# Patient Record
Sex: FEMALE | Race: WHITE | Employment: UNEMPLOYED | URBAN - METROPOLITAN AREA
[De-identification: names, ages, dates, MRNs, and addresses within clinical notes are randomized per-mention and may not be internally consistent; named-entity substitution may affect disease eponyms.]

---

## 2017-06-02 ENCOUNTER — OFFICE VISIT (OUTPATIENT)
Dept: OBGYN CLINIC | Age: 22
End: 2017-06-02

## 2017-06-02 VITALS
SYSTOLIC BLOOD PRESSURE: 103 MMHG | DIASTOLIC BLOOD PRESSURE: 60 MMHG | HEIGHT: 59 IN | BODY MASS INDEX: 18.35 KG/M2 | WEIGHT: 91 LBS | RESPIRATION RATE: 19 BRPM | HEART RATE: 91 BPM

## 2017-06-02 DIAGNOSIS — Z11.3 SCREENING FOR VENEREAL DISEASE: ICD-10-CM

## 2017-06-02 DIAGNOSIS — Z30.09 ENCOUNTER FOR OTHER GENERAL COUNSELING OR ADVICE ON CONTRACEPTION: Primary | ICD-10-CM

## 2017-06-02 RX ORDER — MEDROXYPROGESTERONE ACETATE 150 MG/ML
150 INJECTION, SUSPENSION INTRAMUSCULAR ONCE
Qty: 1 SYRINGE | Refills: 1 | Status: SHIPPED | OUTPATIENT
Start: 2017-06-02 | End: 2017-06-02

## 2017-06-02 NOTE — PROGRESS NOTES
Contraception evaluation/followup    The patient is a 22 y.o.,  Patient's last menstrual period was 2016 (approximate). .     She is here for contraceptive counseling/folllow up. Her current method of family planning is none. The patient is sexually active. She states she is not satisfied with her current form of contraception because: she would like something more effective   These concerns are severe    Contraceptive History: has used other contraception in the past: IUD. Side effects were: moderate. Her current menstrual difficulty history: amenorrhea. Her previous menses she describes as heavy lasting up to 7 days. Pad or tampon count: changes every 4 hours. Preventive Medicine History: Last Pap smear:patient has never had a pap test.       She has had irregular bleeding with Mirena and Nexplanon. Had IUD taken out 2 weeks and no BC for past 2 weeks. Wants to start Depo Provera as she was on it in the past and it worked well. No past medical history on file. No past surgical history on file. Social History     Occupational History    Not on file. Social History Main Topics    Smoking status: Never Smoker    Smokeless tobacco: Never Used    Alcohol use Yes    Drug use: No    Sexual activity: Yes     No family history on file.     No Known Allergies  Prior to Admission medications    Not on File        Review of Systems - History obtained from the patient  Constitutional: negative for weight loss, fever, night sweats  HEENT: negative for hearing loss, earache, congestion, snoring, sorethroat  CV: negative for chest pain, palpitations, edema  Resp: negative for cough, shortness of breath, wheezing  Breast: negative for breast lumps, nipple discharge, galactorrhea  GI: negative for change in bowel habits, abdominal pain, black or bloody stools  : negative for frequency, dysuria, hematuria  MSK: negative for back pain, joint pain, muscle pain  Skin: negative for itching, rash, hives  Neuro: negative for dizziness, headache, confusion, weakness  Psych: negative for anxiety, depression, change in mood  Heme/lymph: negative for bleeding, bruising, pallor      Objective:    Visit Vitals    /60 (BP 1 Location: Left arm, BP Patient Position: Sitting)    Pulse 91    Resp 19    Ht 4' 11\" (1.499 m)    Wt 91 lb (41.3 kg)    LMP 12/01/2016 (Approximate)    BMI 18.38 kg/m2          PHYSICAL EXAMINATION    Constitutional  · Appearance: well-nourished, well developed, alert, in no acute distress    HENT  · Head and Face: appears normal       Neurologic/Psychiatric  · Mental Status:  · Orientation: grossly oriented to person, place and time  · Mood and Affect: mood normal, affect appropriate    Assessment:   Contraception management:       Plan:   Advised to use condoms for 2 weeks and RTO for UPT and first Depo Provera shot, or, if menses in next 2 weeks then RTO for first shot at that time. Instructions given to pt    Total face to face time was 15 minutes and over half of the time was for pt counseling.

## 2017-06-05 NOTE — PROGRESS NOTES
Culture is positive for chlamydia. She needs Zithromax 1 gram po x 1 and a repeat culture in 6 weeks. Partner needs to be treated.

## 2017-06-06 ENCOUNTER — TELEPHONE (OUTPATIENT)
Dept: OBGYN CLINIC | Age: 22
End: 2017-06-06

## 2017-06-06 RX ORDER — AZITHROMYCIN 500 MG/1
TABLET, FILM COATED ORAL
Qty: 2 TAB | Refills: 0 | Status: SHIPPED | OUTPATIENT
Start: 2017-06-06

## 2017-06-06 NOTE — TELEPHONE ENCOUNTER
Call received at 118pm    22year old patient calling back to in response to message left from the office. Patient advised of MD reviewed lab results and recommendations. Prescription sent as per MD order to patient preferred pharmacy. Patient advised of mary moran to have repeat culture in 6 weeks. Patient will call back to schedule that appointment. Patient advised of need for partner to be treated as well. Patient verbalized understanding.

## 2017-06-06 NOTE — PROGRESS NOTES
Patient advised of MD reviewed labs and recommendations. Patient advised of Prescription to be sent as per MD order to patient preferred pharmacy . Patient verbalized understanding and will call back to schedule appointment for repeat culture.

## 2017-07-13 LAB
C TRACH RRNA SPEC QL NAA+PROBE: POSITIVE
N GONORRHOEA RRNA SPEC QL NAA+PROBE: NEGATIVE

## 2017-09-30 ENCOUNTER — HOSPITAL ENCOUNTER (EMERGENCY)
Age: 22
Discharge: HOME | End: 2017-09-30
Attending: STUDENT IN AN ORGANIZED HEALTH CARE EDUCATION/TRAINING PROGRAM
Payer: SELF-PAY

## 2017-09-30 VITALS
RESPIRATION RATE: 18 BRPM | WEIGHT: 100 LBS | DIASTOLIC BLOOD PRESSURE: 54 MMHG | TEMPERATURE: 97.9 F | HEART RATE: 98 BPM | OXYGEN SATURATION: 99 % | HEIGHT: 59 IN | BODY MASS INDEX: 20.16 KG/M2 | SYSTOLIC BLOOD PRESSURE: 115 MMHG

## 2017-09-30 DIAGNOSIS — F19.10 DRUG ABUSE (HCC): ICD-10-CM

## 2017-09-30 DIAGNOSIS — T50.902A OVERDOSE, INTENTIONAL SELF-HARM, INITIAL ENCOUNTER (HCC): Primary | ICD-10-CM

## 2017-09-30 LAB
ALBUMIN SERPL-MCNC: 4.3 G/DL (ref 3.5–5)
ALBUMIN/GLOB SERPL: 1.3 {RATIO} (ref 1.1–2.2)
ALP SERPL-CCNC: 83 U/L (ref 45–117)
ALT SERPL-CCNC: 21 U/L (ref 12–78)
AMPHET UR QL SCN: NEGATIVE
ANION GAP SERPL CALC-SCNC: 12 MMOL/L (ref 5–15)
APAP SERPL-MCNC: <2 UG/ML (ref 10–30)
APPEARANCE UR: CLEAR
AST SERPL-CCNC: 20 U/L (ref 15–37)
BARBITURATES UR QL SCN: NEGATIVE
BASOPHILS # BLD: 0.1 K/UL (ref 0–0.1)
BASOPHILS NFR BLD: 1 % (ref 0–1)
BENZODIAZ UR QL: POSITIVE
BILIRUB SERPL-MCNC: 0.7 MG/DL (ref 0.2–1)
BILIRUB UR QL: NEGATIVE
BUN SERPL-MCNC: 8 MG/DL (ref 6–20)
BUN/CREAT SERPL: 12 (ref 12–20)
CALCIUM SERPL-MCNC: 8.7 MG/DL (ref 8.5–10.1)
CANNABINOIDS UR QL SCN: NEGATIVE
CHLORIDE SERPL-SCNC: 107 MMOL/L (ref 97–108)
CO2 SERPL-SCNC: 23 MMOL/L (ref 21–32)
COCAINE UR QL SCN: POSITIVE
COLOR UR: ABNORMAL
CREAT SERPL-MCNC: 0.68 MG/DL (ref 0.55–1.02)
DRUG SCRN COMMENT,DRGCM: ABNORMAL
EOSINOPHIL # BLD: 0.3 K/UL (ref 0–0.4)
EOSINOPHIL NFR BLD: 4 % (ref 0–7)
ERYTHROCYTE [DISTWIDTH] IN BLOOD BY AUTOMATED COUNT: 12.3 % (ref 11.5–14.5)
ETHANOL SERPL-MCNC: 95 MG/DL
GLOBULIN SER CALC-MCNC: 3.2 G/DL (ref 2–4)
GLUCOSE SERPL-MCNC: 95 MG/DL (ref 65–100)
GLUCOSE UR STRIP.AUTO-MCNC: NEGATIVE MG/DL
HCG UR QL: NEGATIVE
HCT VFR BLD AUTO: 41.9 % (ref 35–47)
HGB BLD-MCNC: 15.3 G/DL (ref 11.5–16)
HGB UR QL STRIP: NEGATIVE
KETONES UR QL STRIP.AUTO: ABNORMAL MG/DL
LEUKOCYTE ESTERASE UR QL STRIP.AUTO: NEGATIVE
LIPASE SERPL-CCNC: 126 U/L (ref 73–393)
LYMPHOCYTES # BLD: 3.5 K/UL (ref 0.8–3.5)
LYMPHOCYTES NFR BLD: 39 % (ref 12–49)
MCH RBC QN AUTO: 33.8 PG (ref 26–34)
MCHC RBC AUTO-ENTMCNC: 36.5 G/DL (ref 30–36.5)
MCV RBC AUTO: 92.7 FL (ref 80–99)
METHADONE UR QL: NEGATIVE
MONOCYTES # BLD: 0.7 K/UL (ref 0–1)
MONOCYTES NFR BLD: 8 % (ref 5–13)
NEUTS SEG # BLD: 4.3 K/UL (ref 1.8–8)
NEUTS SEG NFR BLD: 48 % (ref 32–75)
NITRITE UR QL STRIP.AUTO: NEGATIVE
OPIATES UR QL: NEGATIVE
PCP UR QL: NEGATIVE
PH UR STRIP: 6 [PH] (ref 5–8)
PLATELET # BLD AUTO: 342 K/UL (ref 150–400)
POTASSIUM SERPL-SCNC: 3.9 MMOL/L (ref 3.5–5.1)
PROT SERPL-MCNC: 7.5 G/DL (ref 6.4–8.2)
PROT UR STRIP-MCNC: NEGATIVE MG/DL
RBC # BLD AUTO: 4.52 M/UL (ref 3.8–5.2)
SALICYLATES SERPL-MCNC: <1.7 MG/DL (ref 2.8–20)
SODIUM SERPL-SCNC: 142 MMOL/L (ref 136–145)
SP GR UR REFRACTOMETRY: 1.02 (ref 1–1.03)
UROBILINOGEN UR QL STRIP.AUTO: 0.2 EU/DL (ref 0.2–1)
WBC # BLD AUTO: 8.9 K/UL (ref 3.6–11)

## 2017-09-30 PROCEDURE — 80307 DRUG TEST PRSMV CHEM ANLYZR: CPT | Performed by: STUDENT IN AN ORGANIZED HEALTH CARE EDUCATION/TRAINING PROGRAM

## 2017-09-30 PROCEDURE — 85025 COMPLETE CBC W/AUTO DIFF WBC: CPT | Performed by: STUDENT IN AN ORGANIZED HEALTH CARE EDUCATION/TRAINING PROGRAM

## 2017-09-30 PROCEDURE — 74011250636 HC RX REV CODE- 250/636: Performed by: STUDENT IN AN ORGANIZED HEALTH CARE EDUCATION/TRAINING PROGRAM

## 2017-09-30 PROCEDURE — 96374 THER/PROPH/DIAG INJ IV PUSH: CPT

## 2017-09-30 PROCEDURE — 74011000250 HC RX REV CODE- 250: Performed by: STUDENT IN AN ORGANIZED HEALTH CARE EDUCATION/TRAINING PROGRAM

## 2017-09-30 PROCEDURE — 81025 URINE PREGNANCY TEST: CPT

## 2017-09-30 PROCEDURE — 81003 URINALYSIS AUTO W/O SCOPE: CPT | Performed by: STUDENT IN AN ORGANIZED HEALTH CARE EDUCATION/TRAINING PROGRAM

## 2017-09-30 PROCEDURE — 93005 ELECTROCARDIOGRAM TRACING: CPT

## 2017-09-30 PROCEDURE — 99285 EMERGENCY DEPT VISIT HI MDM: CPT

## 2017-09-30 PROCEDURE — 80053 COMPREHEN METABOLIC PANEL: CPT | Performed by: STUDENT IN AN ORGANIZED HEALTH CARE EDUCATION/TRAINING PROGRAM

## 2017-09-30 PROCEDURE — 90791 PSYCH DIAGNOSTIC EVALUATION: CPT

## 2017-09-30 PROCEDURE — 83690 ASSAY OF LIPASE: CPT | Performed by: STUDENT IN AN ORGANIZED HEALTH CARE EDUCATION/TRAINING PROGRAM

## 2017-09-30 RX ORDER — ONDANSETRON 2 MG/ML
4 INJECTION INTRAMUSCULAR; INTRAVENOUS
Status: COMPLETED | OUTPATIENT
Start: 2017-09-30 | End: 2017-09-30

## 2017-09-30 RX ADMIN — ONDANSETRON 4 MG: 2 INJECTION INTRAMUSCULAR; INTRAVENOUS at 03:39

## 2017-09-30 RX ADMIN — POISON ADSORBENT 50 G: 50 SUSPENSION ORAL at 03:42

## 2017-09-30 RX ADMIN — POISON ADSORBENT 50 G: 50 SUSPENSION ORAL at 03:39

## 2017-09-30 NOTE — ED NOTES
Spoke with niko with poison control, updated on patients labs and LOC. No new recommendations at this time.

## 2017-09-30 NOTE — ED NOTES
MD and David reviewed discharge instructions and options with patient; patient verbalized understanding. Pt. Ambulated to exit without difficulty and in no signs of acute distress. Pt. was provided with outpatient resources for psychiatric and substance abuse help.

## 2017-09-30 NOTE — ED NOTES
Assumed care of patient from [address] Patient resting on stretcher, NAD noted at this time; Denies complaints. Bed low and locked, call bell in reach. Will continue to monitor.

## 2017-09-30 NOTE — ED NOTES
Spoke with Anita Mast from Poison control recommended,  Activated charcoal  CBC  CMP  Salicylate  Acetaminophen  EKG  Dr Ephraim Quintana updated

## 2017-09-30 NOTE — DISCHARGE INSTRUCTIONS
Alcohol, Drug, or Poison Ingestion: Care Instructions  Your Care Instructions  A person can become very sick, or die, from swallowing or using alcohol, drugs, or poisons. Alcohol poisoning occurs when a person drinks a large amount of alcohol. Alcohol can stop nerve signals that control breathing. It can also stop the gag reflex that prevents choking. Alcohol poisoning is serious. It can lead to brain damage or death if it's not treated right away. Drugs can be used by accident or on purpose. They can be swallowed, inhaled, injected, or absorbed through the skin. Drugs include over-the-counter medicine (such as aspirin or acetaminophen) and prescription medicine. They also include vitamins and supplements. And they include illegal drugs such as cocaine and heroin. And poisons are all around us. They include household , cosmetics, houseplants, and garden chemicals. The doctor has checked you carefully, but problems can develop later. If you notice any problems or new symptoms, get medical treatment right away. Follow-up care is a key part of your treatment and safety. Be sure to make and go to all appointments, and call your doctor if you are having problems. It's also a good idea to know your test results and keep a list of the medicines you take. How can you care for yourself at home? Alcohol problems  · Talk to your doctor or counselor about programs that can help you stop using alcohol. · Plan ways to avoid being tempted to drink. ¨ Get rid of all alcohol in your home. ¨ Avoid places where you tend to drink. ¨ Stay away from places or events that offer alcohol. ¨ Stay away from people who drink a lot. Drug problems  · Talk to your doctor about programs that can help you stop using drugs. · Get rid of any drugs you might be tempted to misuse. · Learn how to say no when other people use drugs. · Don't spend time with people who use drugs.   Poison prevention  · Keep products in the containers they came in. Keep them with the original labels. · Be careful when you use cleaning products, paints, solvents, and pesticides. Read labels before use. Use a fan to move strong odors and fumes out of your home. · Do not mix cleaning products. Try to use nontoxic . These include vinegar, lemon juice, and baking soda. When should you call for help? Poison control centers, hospitals, or your doctor can give immediate advice in the case of a poisoning. The [facility] number is [phone]. Have the poison container with you so you can give complete information to the poison control center, such as what the poison or substance is, how much was taken and when. Do not try to make the person vomit. Call 911 anytime you think you may need emergency care. For example, call if you or someone else:  · Has used or currently uses alcohol or drugs and is very confused or can't stay awake. · Has passed out (lost consciousness). · Has severe trouble breathing. · Is having a seizure. Call your doctor now or seek immediate medical care if you or someone else:  · Has new symptoms, or is not acting normally. Watch closely for changes in your health, and be sure to contact your doctor if:  · You do not get better as expected. · You need help with drug or alcohol problems. · You have problems with depression or other mental health issues. Where can you learn more? Go to http://blas-isidro.info/. Enter H362 in the search box to learn more about \"Alcohol, Drug, or Poison Ingestion: Care Instructions. \"  Current as of: March 20, 2017  Content Version: 11.3  © 1962-0567 [employer]. Care instructions adapted under license by [employer] (which disclaims liability or warranty for this information).  If you have questions about a medical condition or this instruction, always ask your healthcare professional. [employer] disclaims any warranty or liability for your use of this information. Misuse of Multiple Drugs: Care Instructions  Your Care Instructions  You have had treatment to help your body get rid of a combination of any of these drugs:  · Prescription medicines  · Over-the-counter medicines  · Alcohol  · Illegal drugs  Taking some drugs together may cause a bad reaction. They can have unexpected or stronger effects on your body and mind. For example, benzodiazepines (such as alprazolam and lorazepam) and alcohol both depress the nervous system. Taken together, each one is stronger than when it is taken by itself. You are getting better, but it takes time for the drugs to leave your body. It may take up to 2 weeks for your mind to clear and your mood to improve. Depending on the drugs you took, the doctor might have:  · Watched your symptoms or done tests to find out what drugs were in your body. · Treated you to control your breathing, blood pressure, and heart rate. · Tried to remove the drugs from your body by pumping your stomach or giving you a substance by mouth that absorbs chemicals. · Given you a substance that neutralizes chemicals (antidote). · Given you oxygen to help you breathe. · Given you fluids. The doctor also watched you carefully to make sure you were recovering safely. Follow-up care is a key part of your treatment and safety. Be sure to make and go to all appointments, and call your doctor if you are having problems. It's also a good idea to know your test results and keep a list of the medicines you take. How can you care for yourself at home? · When you take substances like alcohol and some drugs regularly, your body gets used to them. This is called dependency. If you are dependent on them, you may have withdrawal symptoms when you stop taking them. These symptoms may include trembling, feeling restless, and sweating.  To help get past these:  ¨ Get plenty of rest.  ¨ Drink lots of fluids. ¨ Stay active. ¨ Eat a healthy diet. · If you had a tube in your throat to help you breathe, you may have a sore throat or hoarseness that can last a few days. Drinking fluids may help soothe your throat. Get help to stop using drugs. Talk to your doctor about drug and alcohol treatment programs. When should you call for help? Call 911 anytime you think you may need emergency care. For example, call if:  · You feel you cannot stop from hurting yourself or someone else. Call your doctor now or seek immediate medical care if:  · You have new or worse symptoms of withdrawal, such as trembling, feeling restless, and sweating, that you can't manage at home. Watch closely for changes in your health, and be sure to contact your doctor if:  · You do not get better as expected. · You need help finding the right place to get help with drug or alcohol problems. Where can you learn more? Go to http://blas-isidro.info/. Enter B109 in the search box to learn more about \"Misuse of Multiple Drugs: Care Instructions. \"  Current as of: January 24, 2017  Content Version: 11.3  © 4328-7643 [employer]. Care instructions adapted under license by [employer] (which disclaims liability or warranty for this information). If you have questions about a medical condition or this instruction, always ask your healthcare professional. [place] [address] any warranty or liability for your use of this information. Learning About Drug Misuse  What is drug misuse? Drug misuse means using drugs in a way that harms you or causes you to harm others. Your doctor may call it substance use disorder or drug abuse. It's possible to misuse almost any type of drug, including illegal drugs, prescription drugs, and over-the-counter drugs. An overdose happens when a person takes more than the normal or recommended amount of a drug.  An overdose can result in harmful symptoms or death. Could you have a problem? If there's a chance you may be misusing drugs, it's important to find out. So ask yourself a few questions. · Do you spend a lot of time thinking about your medicine or other drug--getting it and using it? Has that taken the place of other things you used to enjoy? · Have you had problems with your family or friends, or at work, because of your use? · Do you use drugs even when you've told yourself you won't? Do you think you might have a problem? If you do, then you've just taken an important first step. Many people have overcome this problem. And most of them started by reaching out to others, like caring friends or family, their doctor, or a support group. How is drug misuse treated? If you think you may have a problem with drugs, talk to your doctor. You and your doctor can decide whether you have a problem and what type of treatment might help you. You may need to stay in a hospital at first, so that you can be treated for withdrawal symptoms. One of the goals of treatment is helping you get used to life without the drug. Counseling can help you prepare for people or situations that might tempt you to start using again. You can practice these skills through one-on-one counseling, family therapy, or group therapy. Therapy may be part of inpatient treatment, where you stay in a treatment center. Or it may be part of outpatient treatment, where you can fit your therapy around your job or other responsibilities. Another goal of treatment is getting ongoing support for your drug-free life. Many people find support by going to meetings like Narcotics Anonymous or SMART Recovery. This type of support can help you feel less alone and more motivated to stay drug-free. You might talk to your doctor or do an online search for local treatment programs. Or you might tell a friend or loved one that you need help.   Follow-up care is a key part of your treatment and safety. Be sure to make and go to all appointments, and call your doctor if you are having problems. It's also a good idea to know your test results and keep a list of the medicines you take. Where can you learn more? Go to http://blas-isidro.info/. Enter [id number] in the search box to learn more about \"Learning About Drug Misuse. \"  Current as of: March 23, 2017  Content Version: 11.3  © 4381-4821 [employer]. Care instructions adapted under license by [employer] (which disclaims liability or warranty for this information). If you have questions about a medical condition or this instruction, always ask your healthcare professional. [address] any warranty or liability for your use of this information.

## 2017-09-30 NOTE — BSMART NOTE
Comprehensive Assessment Form Part 1      Section I - Disposition    Axis I - Cocaine Abuse                 R/O Unspecified Anxiety Disorder   Axis II - No diagnosis   Axis III - None  Axis IV - family conflict    [place] V - 62      The Medical Doctor to Psychiatrist conference was completed. The Medical Doctor is in agreement with Psychiatrist disposition. Patient does not have a history of SI and is positive for drugs that would have caused shift in mood. The plan is release with resources to [facility] SA program  The on-call Psychiatrist consulted was Dr. Morgan Loyola  The admitting Psychiatrist will be: N/A  The admitting Diagnosis is N/A  The Payor source is N/A     Section II - Integrated Summary    Summary:  Patient arrived to ED with reported drug overdose. Patient reportedly took a wealth of IB profen medication after drinking and arguing with her boyfriend this evening. Boyfriend linked patient to ER and then left her shortly after. Patient denied SI/HI AH/VH or paranoia during evaluation. She presented as guarded and reluctant to share information; doesn't appear to be honest about SA during evaluation. Patient indicated that she was upset this evening and her boyfriend had a bag of pills under the sink that she took. Patient indicated that she injected an unspecified amount of medication but was not intending on committing suicide with the overuse of medication. Patient believes that she took ibuprofen. Her labs and acetaminophen levels don't support these claims. Patient's labs are within normal limits with no decrease in kidney functioning. Patient indicated that she told her boyfriend that she took medication and he linked her to ER. Boyfriend did not stay long and patient indicated that she has not talked to him since he left. She feels safe to go home and does not anticipate having similar conflicts upon her return.  Patient denies a recent history of depression but admits to being anxious from time to time. Patient denied a hx of mental illness in family but states that she has a mayra relationship with her mother which causes her stress. Patient denied drug use but states that she has some alcohol this evening. She states that she does not drink much. Self report is unreliable as Patient reported no drug use but was positive for benzodiapines and cocaine. Patient attributed this to smoking bad week a few days ago; however, Patient is not positive for THC. Patient is not forthcoming with honest information. She does not appear to have current mental health sxs that would warrant inpatient admission. Sxs upon admission appear to be directly related to drug use. Patient does not present with acute mental health sxs. The patienthas demonstrated mental capacity to provide informed consent. The information is given by the patient. The Chief Complaint is presumed overdose. The Precipitant Factors are drug use. Previous Hospitalizations: none reported   The patient has not previously been in restraints. Current Psychiatrist and/or  is None reported. Lethality Assessment:    The potential for suicide noted by the following: not noted during evaluation . The potential for homicide is not noted. The patient has not been a perpetrator of sexual or physical abuse. There are not pending charges. The patient is not felt to be at risk for self harm or harm to others. The attending nurse was advised that IP does not present with acute psychiatric sxs during evaluation. Section III - Psychosocial  The patient's overall mood and attitude is guarded and anxious. Feelings of helplessness and hopelessness are not observed. Generalized anxiety is observed by patient's presentation. Panic is not observed. Phobias are not observed. Obsessive compulsive tendencies are not observed. Section IV - Mental Status Exam  The patient's appearance is unkempt. The patient's behavior is guarded. The patient is oriented to time, place, person and situation. The patient's speech is soft. The patient's mood is anxious. The range of affect shows no evidence of impairment. The patient's thought content demonstrates no evidence of impairment. The thought process shows no evidence of impairment. The patient's perception shows no evidence of impairment. The patient's memory shows no evidence of impairment. The patient's appetite shows no evidence of impairment. The patient's sleep shows no evidence of impairment. The patient shows little insight. The patient's judgement shows no evidence of impairment. Section V - Substance Abuse  The patient is using substances. The patient is using cocaine unknown for unknown with last use on today. The patient has experienced the following withdrawal symptoms: N/A. Section VI - Living Arrangements  The patient has a significant other. The patient lives with a significant other. The patient has no children. The patient does plan to return home upon discharge. The patient does not have legal issues pending. The patient's source of income comes from employment. Adventist and cultural practices have not been voiced at this time. The patient's greatest support comes from boyfriend and this person will not be involved with the treatment. The patient has not been in an event described as horrible or outside the realm of ordinary life experience either currently or in the past.  The patient has not been a victim of sexual/physical abuse. Section VII - Other Areas of Clinical Concern  The highest grade achieved is 12th grade with the overall quality of school experience being described as \"okay\". The patient is currently employed and speaks [place] as a primary language. The patient has no communication impairments affecting communication. The patient's preference for learning can be described as: can read and write adequately.   The patient's hearing is normal.  The patient's vision is normal.      Torres Butler, Resident in [employer] Counselor

## 2017-09-30 NOTE — ED NOTES
Pt on stretcher, tearful, stating \"I just want to gohome. I've been here for a long time. When can I go home? \" BSmart at bedside to talk to patient.

## 2017-09-30 NOTE — ED NOTES
Pt very tearful and trying to call her boyfriend whom left her in the ED without telling her he was leaving. She stated that her and her mother have not been getting along recently. They work together for a clean business and pt stated that her mother fired her today d/t she does not like her boyfriend. Pt stated she does not have anyone and does not want to be here by herself.

## 2017-09-30 NOTE — ED NOTES
0700    Care assumed from Dr. Parul Pearson. Pending evaluation by [facility] for likely admission 2/2 intentional overdose. Patient HD stable, sleeping yet arousable. 8:56 AM  Per update by [facility], some difficulty contacting Psych for dispo. Tox positive for cocaine and benzos. Dispo pending. 9:03 AM  Per BSMART intentional overdose likely situational and secondary to illicit drug abuse, patient is safe for DC home with community resources.

## 2017-09-30 NOTE — ED TRIAGE NOTES
Pt. Barb Duarte in by EMS for taking possibly 100 Ibuprofen 200mg, per EMS pt. States was not suicidal. Pt. States, \"I don't know\" when asked why patient took all the pills. Pt. Tearful at this time.

## 2017-09-30 NOTE — ED PROVIDER NOTES
HPI Comments: 25 y.o. female with no significant past medical history who presents from home via EMS for evaluation s/p drug overdose. Pt states \"I've had a lot of family issues this week\". She took \"a handful\" of Ibuprofen (200mg) approximately 1.5 hours ago as gesture towards harming herself. Pt unsure of how many pills were in the bag. The bag of medication belonged to her significant other who reports \"it was alot\". Pt denies hx of similar attempt. She has no further complaints. Pt admits to consuming \"a couple\" beers tonight. She denies recreational drug use. No significant past medical hx. She further denies chance of pregnancy. There are no other acute medical concerns at this time. PCP: No primary care provider on file. Note written by Siomara Fan, as dictated by Doug Romeo MD 3:24 AM    The history is provided by the patient. No  was used. No past medical history on file. No past surgical history on file. No family history on file. Social History     Social History    Marital status: SINGLE     Spouse name: N/A    Number of children: N/A    Years of education: N/A     Occupational History    Not on file. Social History Main Topics    Smoking status: Never Smoker    Smokeless tobacco: Never Used    Alcohol use Yes    Drug use: No    Sexual activity: Yes     Other Topics Concern    Not on file     Social History Narrative    No narrative on file         ALLERGIES: Review of patient's allergies indicates no known allergies. Review of Systems   Constitutional: Negative for activity change, diaphoresis, fatigue and fever. HENT: Negative for congestion and sore throat. Eyes: Negative for photophobia and visual disturbance. Respiratory: Negative for chest tightness and shortness of breath. Cardiovascular: Negative for chest pain, palpitations and leg swelling.    Gastrointestinal: Negative for abdominal pain, blood in stool, constipation, diarrhea, nausea and vomiting. Genitourinary: Negative for difficulty urinating, dysuria, flank pain, frequency and hematuria. Musculoskeletal: Negative for back pain. Neurological: Negative for dizziness, syncope, numbness and headaches. Psychiatric/Behavioral: Positive for suicidal ideas. Vitals:    09/30/17 0305   BP: 120/64   Pulse: (!) 106   Resp: 19   Temp: 97.6 °F (36.4 °C)   SpO2: 99%   Weight: 45.4 kg (100 lb)   Height: 4' 11\" (1.499 m)            Physical Exam   Constitutional: She is oriented to person, place, and time. She appears well-developed and well-nourished. HENT:   Head: Normocephalic and atraumatic. Nose: Nose normal.   Mouth/Throat: Oropharynx is clear and moist. No oropharyngeal exudate. Eyes: Conjunctivae and EOM are normal. Right eye exhibits no discharge. Left eye exhibits no discharge. No scleral icterus. Neck: Normal range of motion. Neck supple. No JVD present. No tracheal deviation present. No thyromegaly present. Cardiovascular: Regular rhythm, normal heart sounds and intact distal pulses. Tachycardia present. Exam reveals no gallop and no friction rub. No murmur heard. Pulmonary/Chest: Effort normal and breath sounds normal. No stridor. No respiratory distress. She has no wheezes. She has no rales. She exhibits no tenderness. Abdominal: Bowel sounds are normal. She exhibits no distension and no mass. There is no tenderness. There is no rebound. Musculoskeletal: Normal range of motion. She exhibits no edema or tenderness. Lymphadenopathy:     She has no cervical adenopathy. Neurological: She is alert and oriented to person, place, and time. No cranial nerve deficit. Coordination normal.   Skin: Skin is warm and dry. No rash noted. She is not diaphoretic. No erythema. No pallor.    Psychiatric:   Tearful    Note written by Siomara Luis, as dictated by Georgette Linn MD 3:28 AM    MDM  Number of Diagnoses or Management Options  Drug abuse:   Overdose, intentional self-harm, initial encounter [facility]):   Diagnosis management comments: SI, Suicide attempt, suicide gesture, overdose. 24 y/o female presenting to ED after taking aprox 100 Aleve/ibuprofren after getting in argument with her mother. Pt. Also having ETOH this evening. Concern of actual ingestion as boyfriend is intoxicated and says that she took a bag from under the sink and took all the pills. He is certain it was only Aleve/ibupren. Plan:  Cbc, cmp, lipase, ASA, APAP, EToh, UA, HCG level. ECG. Consult poison control. Reasessment:  Will give 2 50 g doses of AC. Continue to monitor. Consult to [facility]. Due to this being SI with intent and pt actually taking meds she will require admission. Sign out to Dr. Woody Zhang who discussed plan of care with. Awaiting [facility]SMART consult. Amount and/or Complexity of Data Reviewed  Clinical lab tests: ordered and reviewed  Obtain history from someone other than the patient: yes  Discuss the patient with other providers: yes    Risk of Complications, Morbidity, and/or Mortality  Presenting problems: moderate  Diagnostic procedures: moderate  Management options: moderate    Critical Care  Total time providing critical care: 30-74 minutes (Total critical care time spend exclusive of procedures:  40 min)    Patient Progress  Patient progress: stable    ED Course       Procedures    ED EKG interpretation:  Rhythm: sinus tachycardia.  Rate (approx.): 126; Axis: normal; ST/T wave: normal.  Note written by Siomara Can, as dictated by Artem Sigala MD 3:34 AM

## 2017-10-01 LAB
ATRIAL RATE: 126 BPM
CALCULATED P AXIS, ECG09: 77 DEGREES
CALCULATED R AXIS, ECG10: 57 DEGREES
CALCULATED T AXIS, ECG11: 38 DEGREES
DIAGNOSIS, 93000: NORMAL
P-R INTERVAL, ECG05: 130 MS
Q-T INTERVAL, ECG07: 302 MS
QRS DURATION, ECG06: 82 MS
QTC CALCULATION (BEZET), ECG08: 437 MS
VENTRICULAR RATE, ECG03: 126 BPM

## 2021-10-28 ENCOUNTER — HOSPITAL ENCOUNTER (EMERGENCY)
Age: 26
Discharge: HOME | End: 2021-10-28
Attending: STUDENT IN AN ORGANIZED HEALTH CARE EDUCATION/TRAINING PROGRAM

## 2021-10-28 VITALS
RESPIRATION RATE: 18 BRPM | OXYGEN SATURATION: 100 % | HEART RATE: 94 BPM | SYSTOLIC BLOOD PRESSURE: 125 MMHG | TEMPERATURE: 98.4 F | DIASTOLIC BLOOD PRESSURE: 80 MMHG

## 2021-10-28 DIAGNOSIS — F19.10 SUBSTANCE ABUSE (HCC): Primary | ICD-10-CM

## 2021-10-28 PROCEDURE — 99282 EMERGENCY DEPT VISIT SF MDM: CPT

## 2021-10-28 NOTE — LETTER
[address]  [address] [phone]  [phone]    Work/School Note    Date: 10/28/2021    To Whom It May concern:    Otilia Lazaro was seen and treated today in the emergency room by the following provider(s):  Attending Provider: Laila Brown MD  Physician Assistant: ONIEL Frost.       Otilia Lazaro is medically cleared for opioid detox program. No concern for benzodiazepine withdrawal.    Sincerely,          ONIEL Krishna

## 2021-10-28 NOTE — ED TRIAGE NOTES
Patient reports she is here because a she is going to a recovery house and she has been taking benzo and she needs medical clearance. She last took \"benzo\" 9 days ago. She is using heroin last used this morning.

## 2021-10-28 NOTE — ED PROVIDER NOTES
24 y/o female presenting with complaint of addiction problem. The patient states that she went to a recovery house this morning for detox from heroin, but during the intake there was concern about possible benzodiazepine withdrawal. The patient states that she had been taking Xanax 1 mg most days, but has not taken any Xanax in the last 9 days. She last used heroin at 930 this morning. She denies tremors, agitation, anxiety, seizures. There are no other acute medical concerns at this time. The history is provided by the patient. No past medical history on file. No past surgical history on file. No family history on file. Social History     Socioeconomic History    Marital status: SINGLE     Spouse name: Not on file    Number of children: Not on file    Years of education: Not on file    Highest education level: Not on file   Occupational History    Not on file   Tobacco Use    Smoking status: Never Smoker    Smokeless tobacco: Never Used   Substance and Sexual Activity    Alcohol use: Yes    Drug use: Yes     Types: Benzodiazepines, Heroin    Sexual activity: Yes   Other Topics Concern    Not on file   Social History Narrative    Not on file     Social Determinants of Health     Financial Resource Strain:     Difficulty of Paying Living Expenses:    Food Insecurity:     Worried About Running Out of Food in the Last Year:     [address] in the Last Year:    Transportation Needs:     Lack of Transportation (Medical):      Lack of Transportation (Non-Medical):    Physical Activity:     Days of Exercise per Week:     Minutes of Exercise per Session:    Stress:     Feeling of Stress :    Social Connections:     Frequency of Communication with Friends and Family:     Frequency of Social Gatherings with Friends and Family:     Attends [employer] Services:     Active Member of Clubs or Organizations:     Attends Club or Organization Meetings:     Marital Status:    Intimate Partner Violence:     Fear of Current or Ex-Partner:     Emotionally Abused:     Physically Abused:     Sexually Abused: ALLERGIES: Patient has no known allergies. Review of Systems   Constitutional: Negative for chills and fever. HENT: Negative for congestion. Respiratory: Positive for cough (baseline). Negative for shortness of breath. Cardiovascular: Negative for chest pain. Gastrointestinal: Negative for diarrhea and vomiting. Genitourinary: Negative for dysuria, frequency and urgency. Musculoskeletal: Negative for arthralgias and myalgias. Neurological: Negative for tremors and weakness. Psychiatric/Behavioral: Negative for agitation. The patient is not nervous/anxious and is not hyperactive. Vitals:    10/28/21 1207   BP: 125/80   Pulse: 94   Resp: 18   Temp: 98.4 °F (36.9 °C)   SpO2: 100%            Physical Exam  Vitals and nursing note reviewed. Constitutional:       General: She is not in acute distress. Appearance: She is well-developed. She is not diaphoretic. HENT:      Head: Normocephalic and atraumatic. Eyes:      Conjunctiva/sclera: Conjunctivae normal.   Cardiovascular:      Rate and Rhythm: Normal rate and regular rhythm. Heart sounds: Normal heart sounds. Pulmonary:      Effort: Pulmonary effort is normal.      Breath sounds: Normal breath sounds. Musculoskeletal:      Cervical back: Normal range of motion and neck supple. Skin:     General: Skin is warm and dry. Neurological:      Mental Status: She is alert and oriented to person, place, and time. MDM        Procedures  Presentation, management, and disposition were discussed with the attending physician, Dr. Amy Cantor, who is in agreement with plan of care. 22 y/o female presenting with complaint of addiction problem. The patient is well-appearing in no acute distress, vitals within normal limits. No restlessness, tremors, agitation, anxiety, no seizures.  Last used Xanax 9 days ago. Low clinical suspicion for potential for benzo withdrawal. Safe to discharge back to recovery program. Strict ED return precautions discussed and provided in writing at time of discharge. The patient verbalized understanding and agreement with this plan.

## 2023-05-20 RX ORDER — AZITHROMYCIN 500 MG/1
TABLET, FILM COATED ORAL
COMMUNITY
Start: 2017-06-06

## 2024-06-17 ENCOUNTER — TRANSCRIBE ORDERS (OUTPATIENT)
Facility: HOSPITAL | Age: 29
End: 2024-06-17

## 2024-06-17 DIAGNOSIS — M54.12 CERVICAL RADICULOPATHY: Primary | ICD-10-CM

## 2024-06-27 ENCOUNTER — HOSPITAL ENCOUNTER (OUTPATIENT)
Facility: HOSPITAL | Age: 29
Discharge: HOME | End: 2024-06-30
Attending: PAIN MEDICINE

## 2024-06-27 DIAGNOSIS — M54.12 CERVICAL RADICULOPATHY: ICD-10-CM

## 2025-02-06 ENCOUNTER — OFFICE VISIT (OUTPATIENT)
Facility: CLINIC | Age: 30
End: 2025-02-06
Payer: MEDICAID

## 2025-02-06 VITALS
TEMPERATURE: 98.6 F | WEIGHT: 128 LBS | HEIGHT: 59 IN | DIASTOLIC BLOOD PRESSURE: 66 MMHG | HEART RATE: 76 BPM | RESPIRATION RATE: 16 BRPM | OXYGEN SATURATION: 100 % | SYSTOLIC BLOOD PRESSURE: 103 MMHG | BODY MASS INDEX: 25.8 KG/M2

## 2025-02-06 DIAGNOSIS — F32.5 MAJOR DEPRESSIVE DISORDER IN REMISSION, UNSPECIFIED WHETHER RECURRENT (HCC): Primary | ICD-10-CM

## 2025-02-06 DIAGNOSIS — F11.90 METHADONE USE: ICD-10-CM

## 2025-02-06 DIAGNOSIS — K59.09 OTHER CONSTIPATION: ICD-10-CM

## 2025-02-06 DIAGNOSIS — F11.91 HISTORY OF HEROIN USE: ICD-10-CM

## 2025-02-06 DIAGNOSIS — R11.0 NAUSEA: ICD-10-CM

## 2025-02-06 DIAGNOSIS — F41.9 ANXIETY: ICD-10-CM

## 2025-02-06 PROBLEM — F32.9 MAJOR DEPRESSIVE DISORDER: Status: ACTIVE | Noted: 2025-02-06

## 2025-02-06 PROBLEM — K59.00 CONSTIPATION: Status: ACTIVE | Noted: 2025-02-06

## 2025-02-06 PROCEDURE — 99203 OFFICE O/P NEW LOW 30 MIN: CPT | Performed by: NURSE PRACTITIONER

## 2025-02-06 RX ORDER — LAMOTRIGINE 200 MG/1
200 TABLET ORAL DAILY
Qty: 30 TABLET | Refills: 1 | Status: SHIPPED | OUTPATIENT
Start: 2025-02-06

## 2025-02-06 RX ORDER — HYDROXYZINE HYDROCHLORIDE 25 MG/1
TABLET, FILM COATED ORAL
COMMUNITY
Start: 2024-05-17 | End: 2025-02-06 | Stop reason: SDUPTHER

## 2025-02-06 RX ORDER — FLUOXETINE 40 MG/1
CAPSULE ORAL
COMMUNITY
Start: 2024-05-17 | End: 2025-02-06 | Stop reason: SDUPTHER

## 2025-02-06 RX ORDER — BUSPIRONE HYDROCHLORIDE 30 MG/1
TABLET ORAL
Qty: 60 TABLET | Refills: 2 | Status: SHIPPED | OUTPATIENT
Start: 2025-02-06

## 2025-02-06 RX ORDER — ONDANSETRON 4 MG/1
4 TABLET, ORALLY DISINTEGRATING ORAL EVERY 8 HOURS PRN
Qty: 30 TABLET | Refills: 1 | Status: SHIPPED | OUTPATIENT
Start: 2025-02-06

## 2025-02-06 RX ORDER — HYDROXYZINE HYDROCHLORIDE 25 MG/1
25 TABLET, FILM COATED ORAL EVERY 6 HOURS PRN
Qty: 30 TABLET | Refills: 2 | Status: SHIPPED | OUTPATIENT
Start: 2025-02-06

## 2025-02-06 RX ORDER — FLUOXETINE 40 MG/1
40 CAPSULE ORAL DAILY
Qty: 90 CAPSULE | Refills: 1 | Status: SHIPPED | OUTPATIENT
Start: 2025-02-06

## 2025-02-06 RX ORDER — LAMOTRIGINE 200 MG/1
TABLET ORAL
COMMUNITY
Start: 2024-05-29 | End: 2025-02-06 | Stop reason: SDUPTHER

## 2025-02-06 RX ORDER — BUSPIRONE HYDROCHLORIDE 30 MG/1
TABLET ORAL
COMMUNITY
Start: 2024-05-17 | End: 2025-02-06 | Stop reason: SDUPTHER

## 2025-02-06 RX ORDER — ONDANSETRON 4 MG/1
TABLET, ORALLY DISINTEGRATING ORAL
COMMUNITY
Start: 2024-03-20 | End: 2025-02-06 | Stop reason: SDUPTHER

## 2025-02-06 ASSESSMENT — PATIENT HEALTH QUESTIONNAIRE - PHQ9
4. FEELING TIRED OR HAVING LITTLE ENERGY: SEVERAL DAYS
SUM OF ALL RESPONSES TO PHQ QUESTIONS 1-9: 3
7. TROUBLE CONCENTRATING ON THINGS, SUCH AS READING THE NEWSPAPER OR WATCHING TELEVISION: SEVERAL DAYS
2. FEELING DOWN, DEPRESSED OR HOPELESS: NOT AT ALL
3. TROUBLE FALLING OR STAYING ASLEEP: NOT AT ALL
SUM OF ALL RESPONSES TO PHQ9 QUESTIONS 1 & 2: 0
SUM OF ALL RESPONSES TO PHQ QUESTIONS 1-9: 3
9. THOUGHTS THAT YOU WOULD BE BETTER OFF DEAD, OR OF HURTING YOURSELF: NOT AT ALL
SUM OF ALL RESPONSES TO PHQ QUESTIONS 1-9: 3
5. POOR APPETITE OR OVEREATING: SEVERAL DAYS
8. MOVING OR SPEAKING SO SLOWLY THAT OTHER PEOPLE COULD HAVE NOTICED. OR THE OPPOSITE, BEING SO FIGETY OR RESTLESS THAT YOU HAVE BEEN MOVING AROUND A LOT MORE THAN USUAL: NOT AT ALL
1. LITTLE INTEREST OR PLEASURE IN DOING THINGS: NOT AT ALL
SUM OF ALL RESPONSES TO PHQ QUESTIONS 1-9: 3
6. FEELING BAD ABOUT YOURSELF - OR THAT YOU ARE A FAILURE OR HAVE LET YOURSELF OR YOUR FAMILY DOWN: NOT AT ALL
10. IF YOU CHECKED OFF ANY PROBLEMS, HOW DIFFICULT HAVE THESE PROBLEMS MADE IT FOR YOU TO DO YOUR WORK, TAKE CARE OF THINGS AT HOME, OR GET ALONG WITH OTHER PEOPLE: NOT DIFFICULT AT ALL

## 2025-02-06 ASSESSMENT — ANXIETY QUESTIONNAIRES
3. WORRYING TOO MUCH ABOUT DIFFERENT THINGS: SEVERAL DAYS
7. FEELING AFRAID AS IF SOMETHING AWFUL MIGHT HAPPEN: SEVERAL DAYS
2. NOT BEING ABLE TO STOP OR CONTROL WORRYING: NOT AT ALL
5. BEING SO RESTLESS THAT IT IS HARD TO SIT STILL: SEVERAL DAYS
6. BECOMING EASILY ANNOYED OR IRRITABLE: NOT AT ALL
1. FEELING NERVOUS, ANXIOUS, OR ON EDGE: SEVERAL DAYS
GAD7 TOTAL SCORE: 4
IF YOU CHECKED OFF ANY PROBLEMS ON THIS QUESTIONNAIRE, HOW DIFFICULT HAVE THESE PROBLEMS MADE IT FOR YOU TO DO YOUR WORK, TAKE CARE OF THINGS AT HOME, OR GET ALONG WITH OTHER PEOPLE: NOT DIFFICULT AT ALL
4. TROUBLE RELAXING: NOT AT ALL

## 2025-02-06 ASSESSMENT — ENCOUNTER SYMPTOMS
BLOOD IN STOOL: 0
NAUSEA: 1
VOMITING: 1
DIARRHEA: 0
CONSTIPATION: 1
ABDOMINAL PAIN: 0

## 2025-02-06 NOTE — ASSESSMENT & PLAN NOTE
Patient referred to psychiatry and counseling  Rx fluoxetine 40 mg, lamotrigine 200 mg daily hydroxyzine 25 mg 3 times daily as needed and buspirone 30 mg twice daily as needed.  Follow-up 6 months

## 2025-02-06 NOTE — PROGRESS NOTES
History of present illness:Gia Lazar is a 29 y.o. female presenting for medication management and establishing care    Depression/anxiety:  Gia has been seeing a psychiatrist through [facility]. Was paying out of pocket, and would like to have her medications managed with a PCP instead. She feels her symptoms are now well managed.  Taking fluoxetine 40 mg, hydroxyzine 25 mg 3 times daily, BuSpar 30 mg twice daily, lamotrigine 200 mg daily. Reports diagnosis of anxiety and depression, will request records.    Nausea/vomiting:  Taking zofran as needed, typically twice monthly.   Intermittent nausea, rarely vomiting  Believes it's anxiety or diet related.    [facility]  Former heroin and fentanyl user. Taking methadone 150mg daily.    Constipation:  Taking Miralax one capful daily      Review of Systems   Gastrointestinal:  Positive for constipation, nausea and vomiting. Negative for abdominal pain, blood in stool and diarrhea.           Past Medical History:   Diagnosis Date    Anxiety     Depression     OCD (obsessive compulsive disorder)     Substance abuse (HCC)      History reviewed. No pertinent surgical history.  History reviewed. No pertinent family history.    Prior to Admission medications    Medication Sig Start Date End Date Taking? Authorizing Provider   ondansetron (ZOFRAN-ODT) 4 MG disintegrating tablet Take 1 tablet by mouth every 8 hours as needed for Nausea or Vomiting 2/6/25  Yes Elizabeth Glass APRN - CNP   lamoTRIgine (LAMICTAL) 200 MG tablet Take 1 tablet by mouth daily 2/6/25  Yes Elizabeth Glass APRN - CNP   hydrOXYzine HCl (ATARAX) 25 MG tablet Take 1 tablet by mouth every 6 hours as needed for Anxiety 2/6/25  Yes Elizabeth Glass APRN - CNP   FLUoxetine (PROZAC) 40 MG capsule Take 1 capsule by mouth daily 2/6/25  Yes Elizabeth Glass APRN - CNP   busPIRone (BUSPAR) 30 MG tablet Take 1 tab by mouth twice daily as needed for anxiety 2/6/25  Yes

## 2025-04-25 DIAGNOSIS — F41.9 ANXIETY: ICD-10-CM

## 2025-04-25 DIAGNOSIS — F32.5 MAJOR DEPRESSIVE DISORDER IN REMISSION, UNSPECIFIED WHETHER RECURRENT: ICD-10-CM

## 2025-04-25 RX ORDER — LAMOTRIGINE 200 MG/1
200 TABLET ORAL DAILY
Qty: 30 TABLET | Refills: 0 | Status: SHIPPED | OUTPATIENT
Start: 2025-04-25

## 2025-06-15 DIAGNOSIS — F32.5 MAJOR DEPRESSIVE DISORDER IN REMISSION, UNSPECIFIED WHETHER RECURRENT: ICD-10-CM

## 2025-06-15 DIAGNOSIS — F41.9 ANXIETY: ICD-10-CM

## 2025-06-19 RX ORDER — BUSPIRONE HYDROCHLORIDE 30 MG/1
30 TABLET ORAL 2 TIMES DAILY PRN
Qty: 60 TABLET | Refills: 2 | OUTPATIENT
Start: 2025-06-19

## 2025-06-19 RX ORDER — LAMOTRIGINE 200 MG/1
TABLET ORAL
Qty: 30 TABLET | Refills: 0 | OUTPATIENT
Start: 2025-06-19

## 2025-06-24 ENCOUNTER — OFFICE VISIT (OUTPATIENT)
Facility: CLINIC | Age: 30
End: 2025-06-24
Payer: MEDICAID

## 2025-06-24 VITALS
HEIGHT: 59 IN | WEIGHT: 112 LBS | DIASTOLIC BLOOD PRESSURE: 65 MMHG | TEMPERATURE: 98.4 F | RESPIRATION RATE: 16 BRPM | HEART RATE: 80 BPM | BODY MASS INDEX: 22.58 KG/M2 | OXYGEN SATURATION: 99 % | SYSTOLIC BLOOD PRESSURE: 94 MMHG

## 2025-06-24 DIAGNOSIS — F32.5 MAJOR DEPRESSIVE DISORDER IN REMISSION, UNSPECIFIED WHETHER RECURRENT: ICD-10-CM

## 2025-06-24 DIAGNOSIS — F41.9 ANXIETY: ICD-10-CM

## 2025-06-24 DIAGNOSIS — R11.0 NAUSEA: ICD-10-CM

## 2025-06-24 PROCEDURE — 99214 OFFICE O/P EST MOD 30 MIN: CPT | Performed by: NURSE PRACTITIONER

## 2025-06-24 RX ORDER — LAMOTRIGINE 200 MG/1
200 TABLET ORAL DAILY
Qty: 30 TABLET | Refills: 1 | Status: SHIPPED | OUTPATIENT
Start: 2025-06-24

## 2025-06-24 RX ORDER — FLUOXETINE HYDROCHLORIDE 40 MG/1
40 CAPSULE ORAL DAILY
Qty: 90 CAPSULE | Refills: 1 | Status: SHIPPED | OUTPATIENT
Start: 2025-06-24

## 2025-06-24 RX ORDER — HYDROXYZINE HYDROCHLORIDE 25 MG/1
25 TABLET, FILM COATED ORAL EVERY 6 HOURS PRN
Qty: 30 TABLET | Refills: 2 | Status: SHIPPED | OUTPATIENT
Start: 2025-06-24

## 2025-06-24 RX ORDER — BUSPIRONE HYDROCHLORIDE 30 MG/1
TABLET ORAL
Qty: 60 TABLET | Refills: 2 | Status: SHIPPED | OUTPATIENT
Start: 2025-06-24

## 2025-06-24 RX ORDER — ONDANSETRON 4 MG/1
4 TABLET, ORALLY DISINTEGRATING ORAL EVERY 8 HOURS PRN
Qty: 30 TABLET | Refills: 1 | Status: SHIPPED | OUTPATIENT
Start: 2025-06-24

## 2025-06-24 SDOH — ECONOMIC STABILITY: FOOD INSECURITY: WITHIN THE PAST 12 MONTHS, YOU WORRIED THAT YOUR FOOD WOULD RUN OUT BEFORE YOU GOT MONEY TO BUY MORE.: NEVER TRUE

## 2025-06-24 SDOH — ECONOMIC STABILITY: FOOD INSECURITY: WITHIN THE PAST 12 MONTHS, THE FOOD YOU BOUGHT JUST DIDN'T LAST AND YOU DIDN'T HAVE MONEY TO GET MORE.: NEVER TRUE

## 2025-06-24 ASSESSMENT — PATIENT HEALTH QUESTIONNAIRE - PHQ9
SUM OF ALL RESPONSES TO PHQ QUESTIONS 1-9: 1
SUM OF ALL RESPONSES TO PHQ QUESTIONS 1-9: 1
2. FEELING DOWN, DEPRESSED OR HOPELESS: NOT AT ALL
1. LITTLE INTEREST OR PLEASURE IN DOING THINGS: SEVERAL DAYS
SUM OF ALL RESPONSES TO PHQ QUESTIONS 1-9: 1
SUM OF ALL RESPONSES TO PHQ QUESTIONS 1-9: 1

## 2025-06-24 ASSESSMENT — ENCOUNTER SYMPTOMS: SHORTNESS OF BREATH: 0

## 2025-06-24 NOTE — PROGRESS NOTES
Chief Complaint   Patient presents with    Medication Refill      Have you been to the ER, urgent care clinic since your last visit?  Hospitalized since your last visit?   NO    Have you seen or consulted any other health care providers outside our system since your last visit?   NO     “Have you had a pap smear?”    NO    No cervical cancer screening on file             
R-1Schedule appointment for further refillsNormal  3. Nausea  -     ondansetron (ZOFRAN-ODT) 4 MG disintegrating tablet; Take 1 tablet by mouth every 8 hours as needed for Nausea or Vomiting, Disp-30 tablet, R-1Normal       Return in about 3 months (around 9/24/2025) for Med refill.     Diagnosis and plan discussed with patient who verbillized understanding.  The patient (or guardian, if applicable) and other individuals in attendance with the patient were advised that Artificial Intelligence will be utilized during this visit to record, process the conversation to generate a clinical note, and support improvement of the AI technology. The patient (or guardian, if applicable) and other individuals in attendance at the appointment consented to the use of AI, including the recording.